# Patient Record
(demographics unavailable — no encounter records)

---

## 2024-11-12 NOTE — ASSESSMENT
[FreeTextEntry1] : 59-year-old woman with mild knee arthritis.  Recommend conservative approach.  Physical therapy alternating different types of NSAIDs and possibly hyaluronic acid injections.  Reviewed these recommendations with the patient.  Agrees with the plan.  Will seek approval for the injections have given her prescription for physical therapy and prescriptions for NSAIDs.  NSAID precautions discussed.  Follow-up in 6 to 8 weeks to see if she is improving.

## 2024-11-12 NOTE — HISTORY OF PRESENT ILLNESS
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  Previously knee pain would respond to over-the-counter NSAIDs but recently has been less effective.  Pain with prolonged standing or when she gets up after a seated position.  No instability symptoms.  No trauma.  Presents to see if something can be done to improve her symptomatology.  Does not walk with a cane or assistive device.  Does not wear any braces.  Past medical history: Hypertension Hypothyroidism  Medications: Metoprolol Synthroid  NKDA  Social: Nurse, non-smoker social EtOH no drug use

## 2024-11-12 NOTE — IMAGING
[de-identified] : Pleasant middle-age woman walks into my office with no antalgia.  Was able to get onto and off of exam table without assistance.  Physical examination: Bilateral knees: Mild tenderness to palpation along medial and lateral joint line left knee and lateral joint line left knee.  Mildly abnormal patellofemoral grind test.  Some crepitus with range of motion of the knee.  Synovial thickening.  No effusion.  Negative Apley's and Syed's test.  Negative Lachman test.  No varus valgus instability.  Knee motion 0-130 degrees without associated pain.  Calf soft no cords.  No geniculate lymph nodes or masses.  Radiographs: Bilateral weightbearing knees (AP, lateral): Minimal joint space narrowing.  Right knee demonstrates marginal osteophytes off the lateral aspects of the right knee.  Otherwise no significant marginal osteophyte subchondral sclerotic or cystic changes.  No peritubular erosive changes.

## 2025-03-20 NOTE — HISTORY OF PRESENT ILLNESS
[FreeTextEntry1] : Patient is 60 years old para 4-0-0-5 last menstrual period January 2020 She denies postmenopausal bleeding and is presently without complaints. Mammogram performed on March 10, 2025 noted focal asymmetry in the retroareolar right breast noted on screening mammogram persists on spot compression views.  Targeted right breast ultrasound was performed.  In the right breast, at the 5 to 6 o'clock position and retroareolar region, there is an irregular hypoechoic mass measuring 0.5 x 0.6 x 0.8 cm.  This correlates with the mammographic finding.  Ultrasound-guided biopsy is recommended.  In the right breast, at the 6 o'clock position in the periareolar region, there is a benign simple cyst measuring 0.5 x 1.2 x 0.4 cm.  Right breast mass for which ultrasound-guided biopsy is recommended.  BI-RADS Category 4; suspicious Patient has appointment for ultrasound-guided biopsy of the right breast on March 26, 2025

## 2025-03-20 NOTE — DISCUSSION/SUMMARY
[FreeTextEntry1] : Pap done Self breast exam stressed Follow-up for right breast ultrasound-guided biopsy as scheduled Follow-up with breast specialist Follow-up yearly or as needed

## 2025-03-20 NOTE — PHYSICAL EXAM
[Chaperone Present] : A chaperone was present in the examining room during all aspects of the physical examination [FreeTextEntry2] : Fanny [Appropriately responsive] : appropriately responsive [Alert] : alert [No Acute Distress] : no acute distress [No Lymphadenopathy] : no lymphadenopathy [Regular Rate Rhythm] : regular rate rhythm [No Murmurs] : no murmurs [Clear to Auscultation B/L] : clear to auscultation bilaterally [Soft] : soft [Non-tender] : non-tender [Non-distended] : non-distended [No HSM] : No HSM [No Lesions] : no lesions [No Mass] : no mass [Oriented x3] : oriented x3 [Examination Of The Breasts] : a normal appearance [No Masses] : no breast masses were palpable [Labia Majora] : normal [Labia Minora] : normal [Normal] : normal [Uterine Adnexae] : normal

## 2025-03-25 NOTE — REASON FOR VISIT
[Other: ____] : [unfilled] [FreeTextEntry1] : Diagnostic Tests: ------------------------ EK25-NSR. LVH. Possible inferior infarct.

## 2025-03-25 NOTE — ASSESSMENT
[FreeTextEntry1] : HTN: BP at goal per ACC/AHA 2018 guidelines -Check TTE.  -Continue with losartan 100mg and MTP 50mg PO BID.  -Check BP at home and keep log.   HLD: , , HDL 38,  (03/25) -Check CT CAC. -Patient would like to defer meds and is trying diet and exercise changes.  -Discussed therapeutic lifestyle changes to promote improved lipid metabolism.   DM: HA1c 6.5% (03/25) -Lifestyle changes for improved glucose control.   Abnormal EKG: LVH and possible inferior infarct.  -Asymptomatic.  -Check TTE.   Follow up in 3 months.

## 2025-03-25 NOTE — HISTORY OF PRESENT ILLNESS
[FreeTextEntry1] : Ms. Latham is a 59yo F with PMHx of HTN, HLD, DM, hypothyroid who presents to establish care. Her PMD is Dr. Sergio Chavez. 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 She was started on losartan and increased MTP with some improvement in BP. She denies CP, SOB, palpitations.

## 2025-04-14 NOTE — HISTORY OF PRESENT ILLNESS
[FreeTextEntry1] : Aurelia is a 59 y/o F who is here for new dx of right breast large duct papilloma.  Pt has PMHx of HTN. Pt denies feeling any palpable masses by herself. Pt denies any breast pain, nipple discharge, and skin changes.   FHx, maternal aunt has ovarian ca  Her imaging is as follows: 03/10/2025 - Right Mammo and US--> BIRADS -There are scattered areas of fibroglandular density.  -The focal asymmetry in the retroareolar right breast noted on the screening mammogram persists on spot compression views.  -In the right breast, at the 5:00 to 6:00 position and periareolar region, there is an irregular hypoechoic mass measuring 0.5 x 0.6 x 0.8 cm. This correlates with the mammographic findings. Ultrasound-guided biopsy is recommended.  -in the right breast, at 6:00 position in periareolar region, there is a benign simple cyst measuring 0.5 x 1.2 x 0.4 cm.  Recommendation: Ultrasound guided biopsy.  03/26/2025 Right US Bx  Breast, Right 5-6 o'clock periareolar mass, ultrasound-guided needle core biopsies: ([email or web address] stop-light) -Sclerosing large duct papilloma associated with focal florid duct hyperplasia, secondary cyst formation, and coarse stromal phosphate calcifications.  The pathology results are concordant with the imaging findings. Recommendation: Surgical/clinical consultation.

## 2025-04-14 NOTE — ASSESSMENT
[FreeTextEntry1] : Aurelia is a 61 y/o F who is here for new dx of right breast large duct papilloma.  On physical exam, there are no discrete masses in either breast or axilla. There is no nipple discharge or inversion bilaterally. There are no skin changes bilaterally. Her pathology results were reviewed. Intraductal papillomas without atypia are considered fibroproliferative lesions. Patients with these lesions are found to have a slightly increased relative risk of breast cancer compared to the reference population, however, the lesions themselves do not have any malignant potential.   The size of the lesion was around 1.2 cm. It is a bit too large for a papilloma. And so, I recommended pt to just have it excised.  Since this is not readily palpable, it will need to be localized preoperatively with a smart clip placement. The benefits and risks of the lumpectomy procedure were explained to the patient, including but not limited to bleeding, infection, seroma and/or hematoma formation, pain, numbness of skin, scarring. She is aware that she will meet with the pre-surgical department here at the hospital for pre-surgery testing. She is also aware that she will likely need to meet with her primary care physician, and/or other medical specialists to obtain clearance for surgery, and to contact them appropriately. total time on encounter: 40 mins PLAN: -Right lumpectomy

## 2025-04-14 NOTE — ASSESSMENT
[FreeTextEntry1] : Aurelia is a 59 y/o F who is here for new dx of right breast large duct papilloma.  On physical exam, there are no discrete masses in either breast or axilla. There is no nipple discharge or inversion bilaterally. There are no skin changes bilaterally. Her pathology results were reviewed. Intraductal papillomas without atypia are considered fibroproliferative lesions. Patients with these lesions are found to have a slightly increased relative risk of breast cancer compared to the reference population, however, the lesions themselves do not have any malignant potential.   The size of the lesion was around 1.2 cm. It is a bit too large for a papilloma. And so, I recommended pt to just have it excised.  Since this is not readily palpable, it will need to be localized preoperatively with a smart clip placement. The benefits and risks of the lumpectomy procedure were explained to the patient, including but not limited to bleeding, infection, seroma and/or hematoma formation, pain, numbness of skin, scarring. She is aware that she will meet with the pre-surgical department here at the hospital for pre-surgery testing. She is also aware that she will likely need to meet with her primary care physician, and/or other medical specialists to obtain clearance for surgery, and to contact them appropriately. total time on encounter: 40 mins PLAN: -Right lumpectomy

## 2025-04-14 NOTE — ADDENDUM
[FreeTextEntry1] :  Documented by Serafin Raymond acting as a scribe for Dr. ORTIZ [facility] on 04/14/2025.

## 2025-04-14 NOTE — DATA REVIEWED
[FreeTextEntry1] : 03/10/2025 - Right Mammo and US--> BIRADS -There are scattered areas of fibroglandular density.  -The focal asymmetry in the retroareolar right breast noted on the screening mammogram persists on spot compression views.  -In the right breast, at the 5:00 to 6:00 position and periareolar region, there is an irregular hypoechoic mass measuring 0.5 x 0.6 x 0.8 cm. This correlates with the mammographic findings. Ultrasound-guided biopsy is recommended.  -in the right breast, at 6:00 position in periareolar region, there is a benign simple cyst measuring 0.5 x 1.2 x 0.4 cm.  Recommendation: Ultrasound guided biopsy.  03/26/2025 Right US Bx  Breast, Right 5-6 o'clock periareolar mass, ultrasound-guided needle core biopsies: ([employer] stop-light) -Sclerosing large duct papilloma associated with focal florid duct hyperplasia, secondary cyst formation, and coarse stromal phosphate calcifications.  The pathology results are concordant with the imaging findings. Recommendation: Surgical/clinical consultation.

## 2025-04-14 NOTE — DATA REVIEWED
[FreeTextEntry1] : 03/10/2025 - Right Mammo and US--> BIRADS -There are scattered areas of fibroglandular density.  -The focal asymmetry in the retroareolar right breast noted on the screening mammogram persists on spot compression views.  -In the right breast, at the 5:00 to 6:00 position and periareolar region, there is an irregular hypoechoic mass measuring 0.5 x 0.6 x 0.8 cm. This correlates with the mammographic findings. Ultrasound-guided biopsy is recommended.  -in the right breast, at 6:00 position in periareolar region, there is a benign simple cyst measuring 0.5 x 1.2 x 0.4 cm.  Recommendation: Ultrasound guided biopsy.  03/26/2025 Right US Bx  Breast, Right 5-6 o'clock periareolar mass, ultrasound-guided needle core biopsies: ([employer] stop-light) -Sclerosing large duct papilloma associated with focal florid duct hyperplasia, secondary cyst formation, and coarse stromal phosphate calcifications.  The pathology results are concordant with the imaging findings. Recommendation: Surgical/clinical consultation.

## 2025-04-14 NOTE — ADDENDUM
[FreeTextEntry1] :  Documented by Serafin Raymond acting as a scribe for Dr. ORTIZ [facility] on 04/14/2025.

## 2025-04-14 NOTE — HISTORY OF PRESENT ILLNESS
[FreeTextEntry1] : Aurelia is a 61 y/o F who is here for new dx of right breast large duct papilloma.  Pt has PMHx of HTN. Pt denies feeling any palpable masses by herself. Pt denies any breast pain, nipple discharge, and skin changes.   FHx, maternal aunt has ovarian ca  Her imaging is as follows: 03/10/2025 - Right Mammo and US--> BIRADS -There are scattered areas of fibroglandular density.  -The focal asymmetry in the retroareolar right breast noted on the screening mammogram persists on spot compression views.  -In the right breast, at the 5:00 to 6:00 position and periareolar region, there is an irregular hypoechoic mass measuring 0.5 x 0.6 x 0.8 cm. This correlates with the mammographic findings. Ultrasound-guided biopsy is recommended.  -in the right breast, at 6:00 position in periareolar region, there is a benign simple cyst measuring 0.5 x 1.2 x 0.4 cm.  Recommendation: Ultrasound guided biopsy.  03/26/2025 Right US Bx  Breast, Right 5-6 o'clock periareolar mass, ultrasound-guided needle core biopsies: ([employer] stop-light) -Sclerosing large duct papilloma associated with focal florid duct hyperplasia, secondary cyst formation, and coarse stromal phosphate calcifications.  The pathology results are concordant with the imaging findings. Recommendation: Surgical/clinical consultation.

## 2025-04-14 NOTE — PAST MEDICAL HISTORY
[Menarche Age ____] : age at menarche was [unfilled] [Menopause Age____] : age at menopause was [unfilled] [Total Preg ___] : G[unfilled] [Live Births ___] : P[unfilled]  [Age At Live Birth ___] : Age at live birth: [unfilled] [FreeTextEntry7] : Less than 5 years  [FreeTextEntry8] : YES

## 2025-04-14 NOTE — END OF VISIT
[FreeTextEntry3] : All medical record entries made by the ariel were at my, MARTY Sanches, direction and personally dictated by me on 04/14/2025. I have reviewed the chart and agreed that the record accurately reflects my personal performance of the history, physical examination, assessment and plan. I have also personally directed, reviewed and agreed with the chart.

## 2025-05-19 NOTE — ASSESSMENT
[FreeTextEntry1] : HTN: BP at goal per ACC/AHA 2018 guidelines -Continue with losartan 100mg and MTP 50mg PO BID.  -Check BP at home and keep log.  -Patient taking Metoprolol Tartrate 50mg QD instead of BID, will increase to BID.   HLD: , , HDL 38,  (03/25) -Check CT CAC. -Patient would like to defer meds and is trying diet and exercise changes.  -Discussed therapeutic lifestyle changes to promote improved lipid metabolism.   DM: HA1c 6.5% (03/25) -Lifestyle changes for improved glucose control.   Abnormal EKG: LVH and possible inferior infarct.  -Asymptomatic.   Follow up with Dr. Shannon in July or sooner PRN.

## 2025-05-19 NOTE — REASON FOR VISIT
[Other: ____] : [unfilled] [FreeTextEntry1] : Diagnostic Tests: ------------------------ EK25-NSR. LVH. Possible inferior infarct.  -------------------- Echo: 2025- TTE EF 65%. Basal septal knuckle without signs of obstruction. Trace MR.

## 2025-05-19 NOTE — ADDENDUM
[FreeTextEntry1] : Patient is an intermediate cardiac risk for right breast lumpectomy to be done on 5/20/2025. Patient should be allowed to take blood pressure medications the morning of the procedure.

## 2025-05-19 NOTE — HISTORY OF PRESENT ILLNESS
[FreeTextEntry1] : Ms. Latham is a 59yo F with PMHx of HTN, HLD, DM, hypothyroid who presents to [facility] care. Her PMD is Dr. Sergio Chavez. 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 She was started on losartan and increased MTP with some improvement in BP. She denies CP, SOB, palpitations.   5/15/2025- Patient went to [facility] for right breast lumpectomy to be done 5/20/2025, blood pressure was noted to be 190/100s. Not checking blood pressure at home. No longer experiencing pulsation in ears. No chest pain, shortness of breath, palpitations, dizziness, headaches, syncope, or LE swelling. Patient taking Metoprolol Tartrate 50mg QD instead of BID, will increase to BID.

## 2025-05-19 NOTE — HISTORY OF PRESENT ILLNESS
[FreeTextEntry1] : Ms. Latham is a 61yo F with PMHx of HTN, HLD, DM, hypothyroid who presents to [facility] care. Her PMD is Dr. Sergio Chavez. 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 She was started on losartan and increased MTP with some improvement in BP. She denies CP, SOB, palpitations.   5/15/2025- Patient went to [facility] for right breast lumpectomy to be done 5/20/2025, blood pressure was noted to be 190/100s. Not checking blood pressure at home. No longer experiencing pulsation in ears. No chest pain, shortness of breath, palpitations, dizziness, headaches, syncope, or LE swelling. Patient taking Metoprolol Tartrate 50mg QD instead of BID, will increase to BID.

## 2025-06-04 NOTE — ADDENDUM
[FreeTextEntry1] :  Documented by Serafin Raymond acting as a scribe for Dr. ORTIZ MARTY on 06/04/2025.

## 2025-06-04 NOTE — HISTORY OF PRESENT ILLNESS
[FreeTextEntry1] : Aurelia is a 59 y/o F who is here POST OP s/p Right lumpectomy for ADH from 05/20/25. Pt states of healing well.   Her imaging is as follows: 03/10/2025 - Right Mammo and US--> BIRADS -There are scattered areas of fibroglandular density. -The focal asymmetry in the retroareolar right breast noted on the screening mammogram persists on spot compression views. -In the right breast, at the 5:00 to 6:00 position and periareolar region, there is an irregular hypoechoic mass measuring 0.5 x 0.6 x 0.8 cm. This correlates with the mammographic findings. Ultrasound-guided biopsy is recommended. -in the right breast, at 6:00 position in periareolar region, there is a benign simple cyst measuring 0.5 x 1.2 x 0.4 cm. Recommendation: Ultrasound guided biopsy.  03/26/2025 Right US Bx Breast, Right 5-6 o'clock periareolar mass, ultrasound-guided needle core biopsies: ([employer] stop-light) -Sclerosing large duct papilloma associated with focal florid duct hyperplasia, secondary cyst formation, and coarse stromal phosphate calcifications. The pathology results are concordant with the imaging findings. Recommendation: Surgical/clinical consultation.  05/20/2025 Pathology Breast, right 5-6 o'clock axis periareolar mass, seed localized lumpectomy: - Focal atypical ductal hyperplasia (ADH), cribriform type with microcalcifications, located 2.0 mm from the nearest medial surgical margin (microscopic measurement). - Intraductal papilloma located adjacent to prior biopsy site changes. - Cystic/papillary apocrine metaplasia associated with calcium oxalate crystals, proliferative type fibrocystic changes associated with phosphate microcalcifications present in small ductules, dilated ducts, and mammary duct ectasia.

## 2025-06-04 NOTE — ASSESSMENT
[FreeTextEntry1] : Aurelia is a 61 y/o F who is here POST OP s/p Right lumpectomy for ADH from 05/20/25. Pt is healing well. The pathology results were discussed with the pt. ADH is a pre-cancerous lesion and so pt will be enrolled in high risk screening program, incorporating B/l breast MRI and mammogram in an alternative fashion every 6 months. I also explained pt about hormone therapy for chemoprevention, and if pt is interested she will be referred to [facility] for discussion.   PLAN: -B/l Breast MRI in 6 months -F/u after imaging with the PA

## 2025-06-04 NOTE — ASSESSMENT
[FreeTextEntry1] : Aurelia is a 61 y/o F who is here POST OP s/p Right lumpectomy for ADH from 05/20/25. Pt is healing well. The pathology results were discussed with the pt. ADH is a pre-cancerous lesion and so pt will be enrolled in high risk screening program, incorporating B/l breast MRI and mammogram in an alternative fashion every 6 months. I also explained pt about hormone therapy for chemoprevention, and if pt is interested she will be referred to [facility] for discussion.   PLAN: -B/l Breast MRI in 6 months -F/u after imaging with the PA

## 2025-06-04 NOTE — DATA REVIEWED
[FreeTextEntry1] : [facility] Accession Number : [id number] Patient:     JELLY FAM   Accession:                             [id number]  Collected Date/Time:                   5/20/2025 09:00 EDT Received Date/Time:                    5/20/2025 09:30 EDT  Surgical Pathology Report - Auth (Verified)  Specimen(s) Submitted Right breast Time obtained: 9:00 am Cold ischemic time <1 hour  Final Diagnosis Breast, right 5-6 o'clock axis periareolar mass, seed localized lumpectomy:  - Focal atypical ductal hyperplasia (ADH), cribriform type with microcalcifications, located 2.0 mm from the nearest medial surgical margin (microscopic measurement). - Intraductal papilloma located adjacent to prior biopsy site changes. - Cystic/papillary apocrine metaplasia associated with calcium oxalate crystals, proliferative type fibrocystic changes associated with phosphate microcalcifications present in small ductules, dilated ducts, and mammary duct ectasia.  Comment: The diagnosis is supported by immunostains negative for CK 5/6 and diffusely strongly positive for ER in the foci of ductal epithelial atypia (ADH).  Note:  All controls show appropriate reactivity.  These immunohistochemical tests have been developed and their performance characteristics determined by [facility],  [address] [place] [address]. It has not been cleared or approved by the U.S. Food and Drug administration.  The FDA has determined that such clearance or approval is not necessary.  This test is used for clinical purposes.  The laboratory is certified under the CLIA-88 as qualified to perform high complexity clinical testing.  These assays have not been validated on decalcified tissues.  Results should be interpreted with caution given the likelihood of false negativity on decalcified specimen.  The interpretation of this test was performed at [facility], [address] [place] [address].  Verified by: Alvarez Laguna M.D. (Electronic Signature) Reported on: 05/28/25 13:44 EDT, [place] [address] Phone: [phone]   Fax: [phone] _________________________________________________________________  Clinical Information Right lumpectomy  Perioperative Diagnosis Right papilloma   Postoperative Diagnosis Right papilloma  Gross Description The specimen is received fresh, labeled "right breast lumpectomy short suture superior, long lateral" and consists of a breast lumpectomy specimen weighing 12 g and measuring 5.0 cm from superior to inferior, 3.0 cm from medial to lateral and 1.0 cm from anterior to posterior. The specimen is oriented by sutures as follows: Short suture superior, long lateral. The specimen is inked as follows: superior - blue, inferior - green, lateral - yellow, medial - orange, anterior - red, posterior - black. The specimen is serially sectioned from superior to inferior. The cut surface of the breast tissue appears homogenous yellow and lobulated with tan, white fibrocystic changes.  Sectioning reveals a possible mass measuring 0.8 x 0.5 cm.  The specimen is entirely submitted.  Summary of Sections: 1A-perpendicular sections-superior margin-1 1B-full section-1 1C-full section-1  (clip) 1D-full section-1  (possible mass) 1E-full section-1 1F-full section-1 1G-full section-1 1H-full section-1 1I-perpendicular sections-inferior margin-1  Total:  9 blocks

## 2025-06-04 NOTE — DATA REVIEWED
[FreeTextEntry1] : Sakina Accession Number : [id number] Patient:     JELLY FAM   Accession:                             [id number]  Collected Date/Time:                   5/20/2025 09:00 EDT Received Date/Time:                    5/20/2025 09:30 EDT  Surgical Pathology Report - Auth (Verified)  Specimen(s) Submitted Right breast Time obtained: 9:00 am Cold ischemic time <1 hour  Final Diagnosis Breast, right 5-6 o'clock axis periareolar mass, seed localized lumpectomy:  - Focal atypical ductal hyperplasia (ADH), cribriform type with microcalcifications, located 2.0 mm from the nearest medial surgical margin (microscopic measurement). - Intraductal papilloma located adjacent to prior biopsy site changes. - Cystic/papillary apocrine metaplasia associated with calcium oxalate crystals, proliferative type fibrocystic changes associated with phosphate microcalcifications present in small ductules, dilated ducts, and mammary duct ectasia.  Comment: The diagnosis is supported by immunostains negative for CK 5/6 and diffusely strongly positive for ER in the foci of ductal epithelial atypia (ADH).  Note:  All controls show appropriate reactivity.  These immunohistochemical tests have been developed and their performance characteristics determined by [facility],  [address] [place] [address]. It has not been cleared or approved by the U.S. Food and Drug administration.  The FDA has determined that such clearance or approval is not necessary.  This test is used for clinical purposes.  The laboratory is certified under the CLIA-88 as qualified to perform high complexity clinical testing.  These assays have not been validated on decalcified tissues.  Results should be interpreted with caution given the likelihood of false negativity on decalcified specimen.  The interpretation of this test was performed at [facility], [address] [place] [address].  Verified by: Alvarez Laguna M.D. (Electronic Signature) Reported on: 05/28/25 13:44 EDT, [place] [address] Phone: [phone]   Fax: [phone] _________________________________________________________________  Clinical Information Right lumpectomy  Perioperative Diagnosis Right papilloma   Postoperative Diagnosis Right papilloma  Gross Description The specimen is received fresh, labeled "right breast lumpectomy short suture superior, long lateral" and consists of a breast lumpectomy specimen weighing 12 g and measuring 5.0 cm from superior to inferior, 3.0 cm from medial to lateral and 1.0 cm from anterior to posterior. The specimen is oriented by sutures as follows: Short suture superior, long lateral. The specimen is inked as follows: superior - blue, inferior - green, lateral - yellow, medial - orange, anterior - red, posterior - black. The specimen is serially sectioned from superior to inferior. The cut surface of the breast tissue appears homogenous yellow and lobulated with tan, white fibrocystic changes.  Sectioning reveals a possible mass measuring 0.8 x 0.5 cm.  The specimen is entirely submitted.  Summary of Sections: 1A-perpendicular sections-superior margin-1 1B-full section-1 1C-full section-1  (clip) 1D-full section-1  (possible mass) 1E-full section-1 1F-full section-1 1G-full section-1 1H-full section-1 1I-perpendicular sections-inferior margin-1  Total:  9 blocks

## 2025-06-04 NOTE — HISTORY OF PRESENT ILLNESS
[FreeTextEntry1] : Aurelia is a 61 y/o F who is here POST OP s/p Right lumpectomy for ADH from 05/20/25. Pt states of healing well.   Her imaging is as follows: 03/10/2025 - Right Mammo and US--> BIRADS -There are scattered areas of fibroglandular density. -The focal asymmetry in the retroareolar right breast noted on the screening mammogram persists on spot compression views. -In the right breast, at the 5:00 to 6:00 position and periareolar region, there is an irregular hypoechoic mass measuring 0.5 x 0.6 x 0.8 cm. This correlates with the mammographic findings. Ultrasound-guided biopsy is recommended. -in the right breast, at 6:00 position in periareolar region, there is a benign simple cyst measuring 0.5 x 1.2 x 0.4 cm. Recommendation: Ultrasound guided biopsy.  03/26/2025 Right US Bx Breast, Right 5-6 o'clock periareolar mass, ultrasound-guided needle core biopsies: ([employer] stop-light) -Sclerosing large duct papilloma associated with focal florid duct hyperplasia, secondary cyst formation, and coarse stromal phosphate calcifications. The pathology results are concordant with the imaging findings. Recommendation: Surgical/clinical consultation.  05/20/2025 Pathology Breast, right 5-6 o'clock axis periareolar mass, seed localized lumpectomy: - Focal atypical ductal hyperplasia (ADH), cribriform type with microcalcifications, located 2.0 mm from the nearest medial surgical margin (microscopic measurement). - Intraductal papilloma located adjacent to prior biopsy site changes. - Cystic/papillary apocrine metaplasia associated with calcium oxalate crystals, proliferative type fibrocystic changes associated with phosphate microcalcifications present in small ductules, dilated ducts, and mammary duct ectasia.

## 2025-06-04 NOTE — END OF VISIT
[FreeTextEntry3] : All medical record entries made by the ariel were at my, MARTY Sanches, direction and personally dictated by me on 06/04/2025. I have reviewed the chart and agreed that the record accurately reflects my personal performance of the history, physical examination, assessment and plan. I have also personally directed, reviewed and agreed with the chart.

## 2025-06-28 NOTE — CONSULT LETTER
[Dear  ___] : Dear  [unfilled], [Consult Letter:] : I had the pleasure of evaluating your patient, [unfilled]. [Please see my note below.] : Please see my note below. [Consult Closing:] : Thank you very much for allowing me to participate in the care of this patient.  If you have any questions, please do not hesitate to contact me. [Sincerely,] : Sincerely, [FreeTextEntry3] : Bsoton Allen MD [DrJin  ___] : Dr. NESBITT

## 2025-06-28 NOTE — PHYSICAL EXAM
[Fully active, able to carry on all pre-disease performance without restriction] : Status 0 - Fully active, able to carry on all pre-disease performance without restriction [Normal] : grossly intact [de-identified] : S/P right breast lumpectomy. Surgical incision is healing well.

## 2025-06-28 NOTE — BEGINNING OF VISIT
[0] : 2) Feeling down, depressed, or hopeless: Not at all (0) [PHQ-2 Negative] : PHQ-2 Negative [Former] : Former [< 15 Years] : < 15 Years [Date Discussed (MM/DD/YY): ___] : Discussed: [unfilled] [Patient/Caregiver not ready to engage] : Patient/Caregiver not ready to engage [[id number]] : 0 [Abdominal Pain] : no abdominal pain [Vomiting] : no vomiting [Constipation] : no constipation

## 2025-06-28 NOTE — HISTORY OF PRESENT ILLNESS
[de-identified] : 61 y/o female is referred by Dr Vaca for right breast ADH, s/p right lumpectomy on 25.  Her workup is as follows: 03/10/2025 - Right Mammo and US--> BIRADS -There are scattered areas of fibroglandular density. -The focal asymmetry in the retroareolar right breast noted on the screening mammogram persists on spot compression views. -In the right breast, at the 5:00 to 6:00 position and periareolar region, there is an irregular hypoechoic mass measuring 0.5 x 0.6 x 0.8 cm. This correlates with the mammographic findings. Ultrasound-guided biopsy is recommended. -In the right breast, at 6:00 position in periareolar region, there is a benign simple cyst measuring 0.5 x 1.2 x 0.4 cm. Recommendation: Ultrasound guided biopsy.  2025 Right US Bx Breast, Right 5-6 o'clock periareolar mass, ultrasound-guided needle core biopsies: ([place] stop-light) -Sclerosing large duct papilloma associated with focal florid duct hyperplasia, secondary cyst formation, and coarse stromal phosphate calcifications. The pathology results are concordant with the imaging findings. Recommendation: Surgical/clinical consultation.  On 2025, she had right breast 5-6 o'clock axis periareolar mass, seed localized lumpectomy: Breast, right 5-6 o'clock axis periareolar mass, seed localized lumpectomy: - Focal atypical ductal hyperplasia (ADH), cribriform type with microcalcifications, located 2.0 mm from the nearest medial surgical margin (microscopic measurement). - Intraductal papilloma located adjacent to prior biopsy site changes. - Cystic/papillary apocrine metaplasia associated with calcium oxalate crystals, proliferative type fibrocystic changes associated with phosphate microcalcifications present in small ductules, dilated ducts, and mammary duct ectasia.  She is , menarche at 13, menopause at 55. She gave the 1st childbirth at 22. She does not have FH of breast cancer. Her maternal aunt has ovarian cancer. Her brother had eye melanoma.

## 2025-06-28 NOTE — ASSESSMENT
[FreeTextEntry1] : 61 yo post menopausal female has right breast ADH, s/p right lumpectomy on 05/20/25. As per Carolynn model, her predicted 5-year risk of developing breast cancer is 3% vs average risk of 1.8%. Her life time risk is 14.5% vs 9.1%.  Assessment and Plan:  We discussed breast cancer risk assessment and risk reduction measurement. Aurelia was found to have right breast ADH, s/p right breast lumpectomy. She was made aware that ADH is benign high risk histology associated with increased risk of developing breast cancer. As per Carolynn model, her 5-year risk of developing breast cancer is 3% vs average risk of 1.8%. We discussed risk reduction measurements which may include closely imaging surveillance with mammogram and breast MRI, and taking risk reduction agent.  Tamoxifen, Raloxifene and AI (Anastrozole or Exemestane) have been used for breast cancer primary prevention. Tamoxifen can be used in both pre- and postmenopausal women. Taking Tamoxifen 20 mg daily for 5 years may reduce risk of breast cancer by about 50%. For woman with atypical hyperplasia, relative risk reduction is > 80%. We reviewed potential side effects of Tamoxifen, which may include but not limit to a small increased risk of cardiovascular events, blood clots and endometrial cancer, vasomotor symptoms, depression, weight gain, hot flashes and cataract. Raloxifene and AI can be given to post menopausal women for primary prevention. The side effect profile of AI is more favorable in terms of cardiovascular events but may cause more musculoskeletal symptoms, arthralgia and bone loss. Raloxifene also has small risk for cardiovascular event, but it improves bone density. She opted to take Raloxifene. A script was sent to her pharmacy.    She will continue surveillance breast imaging with mammogram and breast MRI when indicated. Followup with Dr. Vaca as scheduled.  All her questions were answered appropriately. RTO for followup in 3 months.

## 2025-06-28 NOTE — CONSULT LETTER
[Dear  ___] : Dear  [unfilled], [Consult Letter:] : I had the pleasure of evaluating your patient, [unfilled]. [Please see my note below.] : Please see my note below. [Consult Closing:] : Thank you very much for allowing me to participate in the care of this patient.  If you have any questions, please do not hesitate to contact me. [Sincerely,] : Sincerely, [FreeTextEntry3] : Boston Allen MD [DrJin  ___] : Dr. NESBITT

## 2025-06-28 NOTE — PHYSICAL EXAM
[Fully active, able to carry on all pre-disease performance without restriction] : Status 0 - Fully active, able to carry on all pre-disease performance without restriction [Normal] : grossly intact [de-identified] : S/P right breast lumpectomy. Surgical incision is healing well.

## 2025-06-28 NOTE — REASON FOR VISIT
[FreeTextEntry2] : ADH, breast cancer risk reduction. Dapsone Pregnancy And Lactation Text: This medication is Pregnancy Category C and is not considered safe during pregnancy or breast feeding.

## 2025-06-28 NOTE — HISTORY OF PRESENT ILLNESS
[de-identified] : 59 y/o female is referred by Dr Vaca for right breast ADH, s/p right lumpectomy on 25.  Her workup is as follows: 03/10/2025 - Right Mammo and US--> BIRADS -There are scattered areas of fibroglandular density. -The focal asymmetry in the retroareolar right breast noted on the screening mammogram persists on spot compression views. -In the right breast, at the 5:00 to 6:00 position and periareolar region, there is an irregular hypoechoic mass measuring 0.5 x 0.6 x 0.8 cm. This correlates with the mammographic findings. Ultrasound-guided biopsy is recommended. -In the right breast, at 6:00 position in periareolar region, there is a benign simple cyst measuring 0.5 x 1.2 x 0.4 cm. Recommendation: Ultrasound guided biopsy.  2025 Right US Bx Breast, Right 5-6 o'clock periareolar mass, ultrasound-guided needle core biopsies: ([employer] stop-light) -Sclerosing large duct papilloma associated with focal florid duct hyperplasia, secondary cyst formation, and coarse stromal phosphate calcifications. The pathology results are concordant with the imaging findings. Recommendation: Surgical/clinical consultation.  On 2025, she had right breast 5-6 o'clock axis periareolar mass, seed localized lumpectomy: Breast, right 5-6 o'clock axis periareolar mass, seed localized lumpectomy: - Focal atypical ductal hyperplasia (ADH), cribriform type with microcalcifications, located 2.0 mm from the nearest medial surgical margin (microscopic measurement). - Intraductal papilloma located adjacent to prior biopsy site changes. - Cystic/papillary apocrine metaplasia associated with calcium oxalate crystals, proliferative type fibrocystic changes associated with phosphate microcalcifications present in small ductules, dilated ducts, and mammary duct ectasia.  She is , menarche at 13, menopause at 55. She gave the 1st childbirth at 22. She does not have FH of breast cancer. Her maternal aunt has ovarian cancer. Her brother had eye melanoma.

## 2025-06-28 NOTE — HISTORY OF PRESENT ILLNESS
[de-identified] : 61 y/o female is referred by Dr Vaca for right breast ADH, s/p right lumpectomy on 25.  Her workup is as follows: 03/10/2025 - Right Mammo and US--> BIRADS -There are scattered areas of fibroglandular density. -The focal asymmetry in the retroareolar right breast noted on the screening mammogram persists on spot compression views. -In the right breast, at the 5:00 to 6:00 position and periareolar region, there is an irregular hypoechoic mass measuring 0.5 x 0.6 x 0.8 cm. This correlates with the mammographic findings. Ultrasound-guided biopsy is recommended. -In the right breast, at 6:00 position in periareolar region, there is a benign simple cyst measuring 0.5 x 1.2 x 0.4 cm. Recommendation: Ultrasound guided biopsy.  2025 Right US Bx Breast, Right 5-6 o'clock periareolar mass, ultrasound-guided needle core biopsies: ([place] stop-light) -Sclerosing large duct papilloma associated with focal florid duct hyperplasia, secondary cyst formation, and coarse stromal phosphate calcifications. The pathology results are concordant with the imaging findings. Recommendation: Surgical/clinical consultation.  On 2025, she had right breast 5-6 o'clock axis periareolar mass, seed localized lumpectomy: Breast, right 5-6 o'clock axis periareolar mass, seed localized lumpectomy: - Focal atypical ductal hyperplasia (ADH), cribriform type with microcalcifications, located 2.0 mm from the nearest medial surgical margin (microscopic measurement). - Intraductal papilloma located adjacent to prior biopsy site changes. - Cystic/papillary apocrine metaplasia associated with calcium oxalate crystals, proliferative type fibrocystic changes associated with phosphate microcalcifications present in small ductules, dilated ducts, and mammary duct ectasia.  She is , menarche at 13, menopause at 55. She gave the 1st childbirth at 22. She does not have FH of breast cancer. Her maternal aunt has ovarian cancer. Her brother had eye melanoma.

## 2025-06-28 NOTE — BEGINNING OF VISIT
[0] : 2) Feeling down, depressed, or hopeless: Not at all (0) [PHQ-2 Negative] : PHQ-2 Negative [Former] : Former [< 15 Years] : < 15 Years [Date Discussed (MM/DD/YY): ___] : Discussed: [unfilled] [Patient/Caregiver not ready to engage] : Patient/Caregiver not ready to engage [[id number]] : 0 [Abdominal Pain] : no abdominal pain [Vomiting] : no vomiting [Constipation] : no constipation

## 2025-06-28 NOTE — HISTORY OF PRESENT ILLNESS
[de-identified] : 61 y/o female is referred by Dr Vaca for right breast ADH, s/p right lumpectomy on 25.  Her workup is as follows: 03/10/2025 - Right Mammo and US--> BIRADS -There are scattered areas of fibroglandular density. -The focal asymmetry in the retroareolar right breast noted on the screening mammogram persists on spot compression views. -In the right breast, at the 5:00 to 6:00 position and periareolar region, there is an irregular hypoechoic mass measuring 0.5 x 0.6 x 0.8 cm. This correlates with the mammographic findings. Ultrasound-guided biopsy is recommended. -In the right breast, at 6:00 position in periareolar region, there is a benign simple cyst measuring 0.5 x 1.2 x 0.4 cm. Recommendation: Ultrasound guided biopsy.  2025 Right US Bx Breast, Right 5-6 o'clock periareolar mass, ultrasound-guided needle core biopsies: ([employer] stop-light) -Sclerosing large duct papilloma associated with focal florid duct hyperplasia, secondary cyst formation, and coarse stromal phosphate calcifications. The pathology results are concordant with the imaging findings. Recommendation: Surgical/clinical consultation.  On 2025, she had right breast 5-6 o'clock axis periareolar mass, seed localized lumpectomy: Breast, right 5-6 o'clock axis periareolar mass, seed localized lumpectomy: - Focal atypical ductal hyperplasia (ADH), cribriform type with microcalcifications, located 2.0 mm from the nearest medial surgical margin (microscopic measurement). - Intraductal papilloma located adjacent to prior biopsy site changes. - Cystic/papillary apocrine metaplasia associated with calcium oxalate crystals, proliferative type fibrocystic changes associated with phosphate microcalcifications present in small ductules, dilated ducts, and mammary duct ectasia.  She is , menarche at 13, menopause at 55. She gave the 1st childbirth at 22. She does not have FH of breast cancer. Her maternal aunt has ovarian cancer. Her brother had eye melanoma.

## 2025-06-28 NOTE — HISTORY OF PRESENT ILLNESS
[de-identified] : 61 y/o female is referred by Dr Vaca for right breast ADH, s/p right lumpectomy on 25.  Her workup is as follows: 03/10/2025 - Right Mammo and US--> BIRADS -There are scattered areas of fibroglandular density. -The focal asymmetry in the retroareolar right breast noted on the screening mammogram persists on spot compression views. -In the right breast, at the 5:00 to 6:00 position and periareolar region, there is an irregular hypoechoic mass measuring 0.5 x 0.6 x 0.8 cm. This correlates with the mammographic findings. Ultrasound-guided biopsy is recommended. -In the right breast, at 6:00 position in periareolar region, there is a benign simple cyst measuring 0.5 x 1.2 x 0.4 cm. Recommendation: Ultrasound guided biopsy.  2025 Right US Bx Breast, Right 5-6 o'clock periareolar mass, ultrasound-guided needle core biopsies: ([employer] stop-light) -Sclerosing large duct papilloma associated with focal florid duct hyperplasia, secondary cyst formation, and coarse stromal phosphate calcifications. The pathology results are concordant with the imaging findings. Recommendation: Surgical/clinical consultation.  On 2025, she had right breast 5-6 o'clock axis periareolar mass, seed localized lumpectomy: Breast, right 5-6 o'clock axis periareolar mass, seed localized lumpectomy: - Focal atypical ductal hyperplasia (ADH), cribriform type with microcalcifications, located 2.0 mm from the nearest medial surgical margin (microscopic measurement). - Intraductal papilloma located adjacent to prior biopsy site changes. - Cystic/papillary apocrine metaplasia associated with calcium oxalate crystals, proliferative type fibrocystic changes associated with phosphate microcalcifications present in small ductules, dilated ducts, and mammary duct ectasia.  She is , menarche at 13, menopause at 55. She gave the 1st childbirth at 22. She does not have FH of breast cancer. Her maternal aunt has ovarian cancer. Her brother had eye melanoma.

## 2025-06-28 NOTE — PHYSICAL EXAM
[Fully active, able to carry on all pre-disease performance without restriction] : Status 0 - Fully active, able to carry on all pre-disease performance without restriction [Normal] : grossly intact [de-identified] : S/P right breast lumpectomy. Surgical incision is healing well.

## 2025-06-28 NOTE — BEGINNING OF VISIT
[0] : 2) Feeling down, depressed, or hopeless: Not at all (0) [PHQ-2 Negative] : PHQ-2 Negative [Former] : Former [< 15 Years] : < 15 Years [Date Discussed (MM/DD/YY): ___] : Discussed: [unfilled] [Patient/Caregiver not ready to engage] : Patient/Caregiver not ready to engage [[id number]] : 0 [Abdominal Pain] : no abdominal pain [Vomiting] : no vomiting [Constipation] : no constipation

## 2025-06-28 NOTE — HISTORY OF PRESENT ILLNESS
[de-identified] : 61 y/o female is referred by Dr Vaca for right breast ADH, s/p right lumpectomy on 25.  Her workup is as follows: 03/10/2025 - Right Mammo and US--> BIRADS -There are scattered areas of fibroglandular density. -The focal asymmetry in the retroareolar right breast noted on the screening mammogram persists on spot compression views. -In the right breast, at the 5:00 to 6:00 position and periareolar region, there is an irregular hypoechoic mass measuring 0.5 x 0.6 x 0.8 cm. This correlates with the mammographic findings. Ultrasound-guided biopsy is recommended. -In the right breast, at 6:00 position in periareolar region, there is a benign simple cyst measuring 0.5 x 1.2 x 0.4 cm. Recommendation: Ultrasound guided biopsy.  2025 Right US Bx Breast, Right 5-6 o'clock periareolar mass, ultrasound-guided needle core biopsies: ([place] stop-light) -Sclerosing large duct papilloma associated with focal florid duct hyperplasia, secondary cyst formation, and coarse stromal phosphate calcifications. The pathology results are concordant with the imaging findings. Recommendation: Surgical/clinical consultation.  On 2025, she had right breast 5-6 o'clock axis periareolar mass, seed localized lumpectomy: Breast, right 5-6 o'clock axis periareolar mass, seed localized lumpectomy: - Focal atypical ductal hyperplasia (ADH), cribriform type with microcalcifications, located 2.0 mm from the nearest medial surgical margin (microscopic measurement). - Intraductal papilloma located adjacent to prior biopsy site changes. - Cystic/papillary apocrine metaplasia associated with calcium oxalate crystals, proliferative type fibrocystic changes associated with phosphate microcalcifications present in small ductules, dilated ducts, and mammary duct ectasia.  She is , menarche at 13, menopause at 55. She gave the 1st childbirth at 22. She does not have FH of breast cancer. Her maternal aunt has ovarian cancer. Her brother had eye melanoma.

## 2025-06-28 NOTE — BEGINNING OF VISIT
[0] : 2) Feeling down, depressed, or hopeless: Not at all (0) [PHQ-2 Negative] : PHQ-2 Negative [Former] : Former [< 15 Years] : < 15 Years [Date Discussed (MM/DD/YY): ___] : Discussed: [unfilled] [Patient/Caregiver not ready to engage] : Patient/Caregiver not ready to engage [[id number]] : 0 [Abdominal Pain] : no abdominal pain [Vomiting] : no vomiting [Constipation] : no constipation

## 2025-06-28 NOTE — PHYSICAL EXAM
[Fully active, able to carry on all pre-disease performance without restriction] : Status 0 - Fully active, able to carry on all pre-disease performance without restriction [Normal] : grossly intact [de-identified] : S/P right breast lumpectomy. Surgical incision is healing well.

## 2025-06-28 NOTE — BEGINNING OF VISIT
[0] : 2) Feeling down, depressed, or hopeless: Not at all (0) [PHQ-2 Negative] : PHQ-2 Negative [Former] : Former [< 15 Years] : < 15 Years [Date Discussed (MM/DD/YY): ___] : Discussed: [unfilled] [Patient/Caregiver not ready to engage] : Patient/Caregiver not ready to engage [[id number]] : 0 [Abdominal Pain] : no abdominal pain [Vomiting] : no vomiting [Constipation] : no constipation

## 2025-06-28 NOTE — CONSULT LETTER
[Dear  ___] : Dear  [unfilled], [Consult Letter:] : I had the pleasure of evaluating your patient, [unfilled]. [Please see my note below.] : Please see my note below. [Consult Closing:] : Thank you very much for allowing me to participate in the care of this patient.  If you have any questions, please do not hesitate to contact me. [Sincerely,] : Sincerely, [FreeTextEntry3] : Boston Allne MD [DrJin  ___] : Dr. NESBITT

## 2025-06-28 NOTE — BEGINNING OF VISIT
[0] : 2) Feeling down, depressed, or hopeless: Not at all (0) [PHQ-2 Negative] : PHQ-2 Negative [Former] : Former [< 15 Years] : < 15 Years [Date Discussed (MM/DD/YY): ___] : Discussed: [unfilled] [Patient/Caregiver not ready to engage] : Patient/Caregiver not ready to engage [[id number]] : 0 [Abdominal Pain] : no abdominal pain [Vomiting] : no vomiting [Constipation] : no constipation

## 2025-06-28 NOTE — ASSESSMENT
[FreeTextEntry1] : 59 yo post menopausal female has right breast ADH, s/p right lumpectomy on 05/20/25. As per Carolynn model, her predicted 5-year risk of developing breast cancer is 3% vs average risk of 1.8%. Her life time risk is 14.5% vs 9.1%.  Assessment and Plan:  We discussed breast cancer risk assessment and risk reduction measurement. Aurelia was found to have right breast ADH, s/p right breast lumpectomy. She was made aware that ADH is benign high risk histology associated with increased risk of developing breast cancer. As per Carolynn model, her 5-year risk of developing breast cancer is 3% vs average risk of 1.8%. We discussed risk reduction measurements which may include closely imaging surveillance with mammogram and breast MRI, and taking risk reduction agent.  Tamoxifen, Raloxifene and AI (Anastrozole or Exemestane) have been used for breast cancer primary prevention. Tamoxifen can be used in both pre- and postmenopausal women. Taking Tamoxifen 20 mg daily for 5 years may reduce risk of breast cancer by about 50%. For woman with atypical hyperplasia, relative risk reduction is > 80%. We reviewed potential side effects of Tamoxifen, which may include but not limit to a small increased risk of cardiovascular events, blood clots and endometrial cancer, vasomotor symptoms, depression, weight gain, hot flashes and cataract. Raloxifene and AI can be given to post menopausal women for primary prevention. The side effect profile of AI is more favorable in terms of cardiovascular events but may cause more musculoskeletal symptoms, arthralgia and bone loss. Raloxifene also has small risk for cardiovascular event, but it improves bone density. She opted to take Raloxifene. A script was sent to her pharmacy.    She will continue surveillance breast imaging with mammogram and breast MRI when indicated. Followup with Dr. Vaca as scheduled.  All her questions were answered appropriately. RTO for followup in 3 months.

## 2025-06-28 NOTE — PHYSICAL EXAM
[Fully active, able to carry on all pre-disease performance without restriction] : Status 0 - Fully active, able to carry on all pre-disease performance without restriction [Normal] : grossly intact [de-identified] : S/P right breast lumpectomy. Surgical incision is healing well.

## 2025-06-28 NOTE — PHYSICAL EXAM
[Fully active, able to carry on all pre-disease performance without restriction] : Status 0 - Fully active, able to carry on all pre-disease performance without restriction [Normal] : grossly intact [de-identified] : S/P right breast lumpectomy. Surgical incision is healing well.

## 2025-07-23 NOTE — REASON FOR VISIT
[Other: ____] : [unfilled] [FreeTextEntry1] : Diagnostic Tests: ------------------------ [id number]. LVH. Possible inferior infarct.  -------------------- Echo: 25-TTE: EF 65%. Basal septal knuckle without signs of obstruction. Trace MR.

## 2025-07-23 NOTE — ASSESSMENT
[FreeTextEntry1] : HTN: BP at goal per ACC/AHA 2018 guidelines -Continue with losartan 100mg and MTP 50mg PO BID.  -Check BP at home and keep log.  -Patient taking Metoprolol Tartrate 50mg QD instead of BID, will increase to BID.   HLD: , , HDL 38,  (03/25) -Check CT CAC. -Patient would like to defer meds and is trying diet and exercise changes.  -Discussed therapeutic lifestyle changes to promote improved lipid metabolism.   DM: HA1c 6.5% (03/25) -Lifestyle changes for improved glucose control.   Abnormal EKG: LVH and possible inferior infarct.  -Asymptomatic.   Follow up in 4 months -Check labs.

## 2025-07-23 NOTE — HISTORY OF PRESENT ILLNESS
[FreeTextEntry1] : Ms. Latham is a 61yo F with PMHx of HTN, HLD, DM, hypothyroid who presents for follow up Her PMD is Dr. Sergio Chavez. 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 She was started on losartan and increased MTP with some improvement in BP. She denies CP, SOB, palpitations.   5/15/2025- Patient went to [facility] for right breast lumpectomy to be done 5/20/2025, blood pressure was noted to be 190/100s. Not checking blood pressure at home. No longer experiencing pulsation in ears. No chest pain, shortness of breath, palpitations, dizziness, headaches, syncope, or LE swelling. Patient taking Metoprolol Tartrate 50mg QD instead of BID, will increase to BID.  07/23/25-Patient had lumpectomy which showed atypical ductal hyperplasia and started on Raloxifene.